# Patient Record
Sex: FEMALE | Race: ASIAN
[De-identification: names, ages, dates, MRNs, and addresses within clinical notes are randomized per-mention and may not be internally consistent; named-entity substitution may affect disease eponyms.]

---

## 2021-03-15 ENCOUNTER — HOSPITAL ENCOUNTER (OUTPATIENT)
Dept: HOSPITAL 76 - DI | Age: 51
Discharge: HOME | End: 2021-03-15
Attending: STUDENT IN AN ORGANIZED HEALTH CARE EDUCATION/TRAINING PROGRAM
Payer: COMMERCIAL

## 2021-03-15 DIAGNOSIS — N63.21: Primary | ICD-10-CM

## 2021-03-15 DIAGNOSIS — N63.32: ICD-10-CM

## 2021-03-15 PROCEDURE — 71260 CT THORAX DX C+: CPT

## 2021-03-15 PROCEDURE — 78306 BONE IMAGING WHOLE BODY: CPT

## 2021-03-15 PROCEDURE — 74177 CT ABD & PELVIS W/CONTRAST: CPT

## 2021-03-15 NOTE — CT REPORT
PROCEDURE:  Abdomen/Pelvis W

 

INDICATIONS:  HX L BREAST CA

 

CONTRAST:  IV CONTRAST: Optiray 320 ml: 100 PO CONTRAST: Isovue 300 ml50

 

TECHNIQUE:  

After the administration of oral and intravenous contrast, 5 mm thick sections acquired from the diap
hragms to the symphysis.  5 mm thick coronal and sagittal reformats were acquired.  For radiation dos
e reduction, the following was used:  automated exposure control, adjustment of mA and/or kV accordin
g to patient size.  

 

COMPARISON:  None.

 

FINDINGS:  

Image quality:  Excellent.  

 

ABDOMEN:  

Lung bases:  Lung bases are clear.  Heart size is normal.  

 

Solid organs:  Liver and spleen are normal in size and enhancement.  Gallbladder appears normal.  Rafa
iary system is non dilated.  Pancreas enhances normally.  No adrenal nodules.  Kidneys demonstrate no
rmal size and enhancement, without hydronephrosis.  

 

Peritoneum and bowel:  Bowel loops demonstrate normal wall thickness and caliber.  No free fluid or a
ir.  

 

Nodes and vessels:  No retroperitoneal or mesenteric adenopathy by size criteria.  Aorta and inferior
 vena cava are normal in size.  

 

Miscellaneous:  No ventral hernias.  

 

 

PELVIS:  

Genitourinary:  Bladder wall thickness is normal.  The uterus has an abnormal contour with heterogene
ous enhancement, most likely secondary to multiple uterine fibroids. No suspicious adnexal mass is id
entified.

 

Miscellaneous:  No inguinal hernias or adenopathy.  

 

Bones:  No suspicious bony lesions.  No vertebral body compression fractures.  

 

IMPRESSION:  

1.  No CT evidence of metastatic disease in the abdomen or pelvis.

 

2.  Heterogeneous enhancement and lobular appearance of the uterus is most likely secondary to multip
le uterine fibroids. Pelvic ultrasound may be obtained for confirmation if clinically indicated.

 

Reviewed by: Marlon Long MD on 3/15/2021 3:21 PM PDT

Approved by: Marlon Long MD on 3/15/2021 3:21 PM PDT

 

 

Station ID:  [id number]

## 2021-03-15 NOTE — CT REPORT
PROCEDURE:  CHEST W

 

INDICATIONS:  HX L BREAST CA

 

CONTRAST:  IV CONTRAST: Optiray 320 ml: 100 PO CONTRAST: Isovue 300 ml50

 

TECHNIQUE:  

After the administration of intravenous contrast, 5 mm thick sections acquired from the pulmonary api
juancho to the posterior costophrenic angles.  7 mm thick coronal MIP reformats were acquired.  For radia
tion dose reduction, the following was used:  automated exposure control, adjustment of mA and/or kV 
according to patient size.

 

COMPARISON:  None.

 

FINDINGS:  

Image quality:  Excellent.  

 

Lungs and pleura:  No acute air space opacities.  No pleural effusions or pneumothorax.  Central and 
peripheral airways are patent and normal in caliber.  

 

Mediastinum:  Heart size is normal.  No pericardial effusion.  No mediastinal or hilar adenopathy by 
size criteria.  Thoracic aorta and central pulmonary arteries are normal in size.  Esophagus is emily
l in caliber.  No hiatal hernia.  

 

Bones and chest wall:  A peripherally enhancing mass is seen in the upper outer quadrant of the left 
breast measuring approximately 5.9 x 4.1 x 6.5 cm. There is central hypoattenuation that may indicate
 necrosis. A few surgical clips are seen along the posterolateral margin of the mass. The mass abuts 
the skin surface anterosuperiorly. There is focal obliteration of the fat plane between the mass in t
he left anterior chest wall (image 32 of series 3). 

 

A 2 x 1.6 cm peripherally enhancing lesion is seen deep to the lateral margin of the pectoralis major
 muscle that may represent a enlarged level 2 lymph node. A heterogeneously enhancing mass in the lef
t axilla measures 3.2 x 1.9 cm with associated surgical clips, most likely representing axillary lymp
hadenopathy.

 

Possible nonspecific small left internal mammary lymph nodes are seen (images 33 and 26 of series 3) 
measuring up to 0.4 cm in short axis diameter. 

 

No supraclavicular lymph nodes are seen. No right axillary lymph nodes are identified. 

 

No suspicious bony lesions.  No vertebral body compression fractures.  Thyroid gland appears normal. 
 

 

Abdomen:  Visualized upper abdominal solid organs appear normal.  Upper abdominal bowel loops are nor
mal in caliber.  

 

IMPRESSION:  

1.  Large peripherally enhancing mass in the left breast measures up to 6.5 cm in greatest dimension 
and abuts the skin surface anteriorly. There is focal loss of fat plane between the mass and the ante
rior chest wall, and involvement of the adjacent musculature is not excluded. No osseous invasion is 
seen.

 

2.  Left axillary 3.2 x 1.9 cm peripherally enhancing mass most likely represents axillary metastatic
 disease. Additional enlarged level 2 lymph node is seen deep to the left pectoralis major muscle anisa
suring 2 x 1.6 cm. Questionable nonspecific small left internal mammary lymph nodes are seen measurin
g up to 0.4 cm in short axis diameter. No supraclavicular or right axillary lymphadenopathy.

 

3.  No signs of distant metastatic disease in the chest.

 

Reviewed by: Marlon Long MD on 3/15/2021 3:13 PM PDT

Approved by: Marlon Long MD on 3/15/2021 3:13 PM PDT

 

 

Station ID:  [id number]

## 2021-03-15 NOTE — NUCLEAR MEDICINE REPORT
PROCEDURE:  Bone Whole Body

 

INDICATIONS:  HX L BREAST CA

 

RADIOPHARMACEUTICAL:  26.1 mCi Tc-99m MDP IV.  

 

TECHNIQUE:  

Delayed whole-body scintigrams were obtained approximately 3-4 hours after intravenous injection of r
adiotracer.  Anterior and posterior views were acquired from vertex to feet.  Additional left and rig
ht oblique views of the thorax as well as lateral views of the calvarium were obtained.  

 

COMPARISON:  CT chest, abdomen, pelvis 3/15/2021

 

FINDINGS:  

 

No suspicious foci of uptake demonstrated to suggest osseous metastatic disease. There is normal excr
etion within the kidneys and bladder. There is a small amount of radiopharmaceutical projecting over 
the left chest wall which may correspond to the patient's left breast cancer.

 

IMPRESSION:  

 

1. No definite osseous metastatic disease.

 

2. Indistinct activity demonstrated in the region of the left chest wall may correspond to uptake in 
patient's left breast cancer.

 

Reviewed by: Luis Fernando Gudino MD on 3/15/2021 2:34 PM PDT

Approved by: Luis Fernando Gudino MD on 3/15/2021 2:34 PM PDT

 

 

Station ID:  [id number]

## 2022-06-14 ENCOUNTER — HOSPITAL ENCOUNTER (OUTPATIENT)
Dept: HOSPITAL 76 - DI | Age: 52
Discharge: HOME | End: 2022-06-14
Attending: INTERNAL MEDICINE
Payer: COMMERCIAL

## 2022-06-14 DIAGNOSIS — C50.412: Primary | ICD-10-CM

## 2022-06-14 DIAGNOSIS — Z90.12: ICD-10-CM

## 2022-06-14 PROCEDURE — 74176 CT ABD & PELVIS W/O CONTRAST: CPT

## 2022-06-14 PROCEDURE — 71250 CT THORAX DX C-: CPT

## 2022-06-14 NOTE — CT REPORT
PROCEDURE:  CHEST WO

 

INDICATIONS:  F/U BREAST CANCER STAGE 3-4

 

TECHNIQUE: 

Noncontrast 1mm axial images were acquired from the pulmonary apices to the posterior costophrenic an
gles.  Axial 5 mm soft tissue kernel reconstructions were performed as well as 8 mm axial MIP and cor
onal and sagittal 5 mm reformations. For radiation dose reduction, the following was used: automate
d exposure control, adjustment of mA and/or kV according to patient size.

 

COMPARISON:  3/15/2021

 

FINDINGS:  

Image quality:  Excellent.  

 

Lungs and pleura:  No acute air space opacities.  No pleural effusions or pneumothorax.  Central and 
peripheral airways are patent and normal in caliber.  

 

Mediastinum:  Heart size is normal.  No pericardial effusion.  No mediastinal adenopathy by size crit
eria.  Thoracic aorta and central pulmonary arteries are normal in size.  Esophagus is normal in ruben
senthil.  No hiatal hernia.  

 

Bones and chest wall:  No suspicious bony lesions.  No vertebral body compression fractures.  No axil
maria ines or supraclavicular adenopathy by size criteria. Surgical absence of the left breast. Left axilla
ry node resection. Right chest Port-A-Cath, the tip which extends to the SVC right atrial junction. T
hyroid is grossly unremarkable as imaged.

 

Abdomen: Large fecal load. Visualized upper abdominal solid organs and bowel loops appear normal in t
he absence of contrast.  

 

IMPRESSION:  

 

1. Interval left mastectomy and left axillary node resection.

 

2. No evidence of residual, recurrent, or static disease in the chest.

 

3. Large fecal load.

 

 

CLINICAL RECOMMENDATION STATEMENTS:

In patients <35 years with an ITN detected on CT, MRI, or extrathyroidal ultrasound, the Committee re
commends further evaluation with dedicated thyroid ultrasound if the nodule is "e1 cm and has no susp
icious imaging features, and if the patient has normal life expectancy.

In patients "e35 years with an ITN detected on CT, MRI, or extrathyroidal ultrasound, the Committee r
ecommends further evaluation with dedicated thyroid ultrasound if the nodule is "e1.5 cm and has no s
uspicious imaging features, and if the patient has normal life expectancy. (ACR, 2014)

 

Reviewed by: Ken Aguirre MD on 6/14/2022 12:21 PM PDT

Approved by: Ken Aguirre MD on 6/14/2022 12:21 PM PDT

 

 

Station ID:  [id number]

## 2022-06-14 NOTE — CT REPORT
PROCEDURE:  Abdomen/Pelvis WO

 

INDICATIONS:  F/U BREAST CANCER STAGE 3-4

 

TECHNIQUE:  

Noncontrast 5 mm thick sections acquired from the diaphragms to the symphysis.  5 mm coronal and sagi
ttal reformats were then performed.  For radiation dose reduction, the following was used:  automated
 exposure control, adjustment of mA and/or kV according to patient size.

 

COMPARISON:  CT abdomen and pelvis with contrast dated 3/15/2021.

 

FINDINGS:  

Image quality:  Excellent.  

 

ABDOMEN:  

Lung bases:  Lung bases are clear.  Heart size is normal.  Left breast is surgically absent.

 

Solid organs:  Liver and spleen are normal in size.  Gallbladder is unremarkable, without calcified g
allstones.  Pancreas is normal in contours.  No adrenal nodules.  Kidneys are normal in size, without
 hydronephrosis or nephrolithiasis.  

 

Peritoneum and bowel:  Unenhanced bowel loops demonstrate normal wall thickness and caliber.  No free
 fluid or air.  Very large fecal load.

 

Nodes and vessels:  No retroperitoneal or mesenteric adenopathy by size criteria.  Aorta and inferior
 vena cava are normal in caliber.  

 

Miscellaneous:  No ventral hernias.  

 

 

PELVIS:  

Genitourinary:  Bladder wall thickness is normal.  

 

Miscellaneous:  No inguinal hernias or adenopathy.  

 

Bones:  No suspicious bony lesions.  No vertebral body compression fractures.  

 

IMPRESSION:  

 

1. Negative CT of the abdomen and pelvis with contrast for evidence of metastatic disease.

 

2. Very large fecal load.

 

Reviewed by: Ken Aguirre MD on 6/14/2022 12:36 PM PDT

Approved by: Ken Aguirre MD on 6/14/2022 12:36 PM PDT

 

 

Station ID:  [id number]

## 2023-03-14 ENCOUNTER — HOSPITAL ENCOUNTER (OUTPATIENT)
Dept: HOSPITAL 76 - DI | Age: 53
Discharge: HOME | End: 2023-03-14
Attending: INTERNAL MEDICINE
Payer: COMMERCIAL

## 2023-03-14 DIAGNOSIS — M79.81: ICD-10-CM

## 2023-03-14 DIAGNOSIS — C50.412: Primary | ICD-10-CM

## 2023-03-14 DIAGNOSIS — M25.532: ICD-10-CM

## 2023-03-14 PROCEDURE — 71260 CT THORAX DX C+: CPT

## 2023-03-14 PROCEDURE — 74177 CT ABD & PELVIS W/CONTRAST: CPT

## 2023-03-14 NOTE — CT REPORT
PROCEDURE:  CHEST W

 

INDICATIONS:  BREAST CA

 

CONTRAST:100ml Omnipaque 300 

 

TECHNIQUE:  

After the administration of intravenous contrast, 1 mm axial images were acquired from the pulmonary 
apices through the posterior costophrenic angles.  Axial 5 mm soft tissue kernel reconstructions were
 performed as well as 8 mm axial MIP and coronal and sagittal 5 mm reformations.  For radiation dose 
reduction, the following was used:  automated exposure control, adjustment of mA and/or kV according 
to patient size.

 

COMPARISON:  CT chest, 6/14/2022.

 

FINDINGS:  

Image quality:  Excellent.  

 

Lungs and pleura:  No acute air space opacities.  No pleural effusions or pneumothorax.  Central and 
peripheral airways are patent and normal in caliber.  

 

Mediastinum:  Heart size is normal.  No pericardial effusion.  No mediastinal or hilar adenopathy by 
size criteria.  Thoracic aorta and central pulmonary arteries are normal in size.  Esophagus is emily
l in caliber.  No hiatal hernia.  

 

Bones and chest wall:  Left mastectomy. Surgical clips in left axilla consistent with axillary lymph 
node dissection. No suspicious bony lesions.  No vertebral body compression fractures.  No axillary o
r supraclavicular adenopathy by size criteria.  The thyroid is normal in size and there are no incide
ntal findings. There is a Port-A-Cath in the right anterior chest.

 

Abdomen:  Visualized upper abdominal solid organs appear normal.  Upper abdominal bowel loops are nor
mal in caliber.  

 

IMPRESSION:  

 

1. Postsurgical changes related to left mastectomy and axillary lymph node dissection.

2. No nati or distant metastasis in thorax.

 

Reviewed by: Phil Link MD on 3/14/2023 3:44 PM AKDT

Approved by: Phil Link MD on 3/14/2023 3:44 PM AKDT

 

 

Station ID:  [id number]

## 2023-03-15 NOTE — CT REPORT
PROCEDURE:  ABDOMEN/PELVIS W

 

INDICATIONS:  BREAST CA

 

CONTRAST: 100ml Omnipaque 300 

 

TECHNIQUE:  

After the administration of oral and intravenous contrast, 5 mm thick sections acquired from the diap
hragms to the symphysis.  5 mm thick coronal and sagittal reformats were acquired.  For radiation dos
e reduction, the following was used:  automated exposure control, adjustment of mA and/or kV accordin
g to patient size.  

 

COMPARISON:  CT abdomen and pelvis, 6/14/2022. CT of abdomen and pelvis, 3/15/2021.

 

FINDINGS:  

Image quality:  Excellent.  

 

ABDOMEN:  

Lung bases:  Lung bases are clear.  Heart size is normal.  

 

Solid organs:  Liver and spleen are normal in size and enhancement.  Gallbladder is normal  Biliary s
ystem is non dilated.  Pancreas enhances normally.  No adrenal nodules.  Kidneys demonstrate normal s
ize and enhancement, without hydronephrosis.  

 

Peritoneum and bowel:  Bowel loops demonstrate normal wall thickness and caliber. There is a large am
ount stool in colon. No free fluid or air.  

 

Nodes and vessels:  No retroperitoneal or mesenteric adenopathy by size criteria.  Aorta and inferior
 vena cava are normal in size.  

 

Miscellaneous:  No ventral hernias.  

 

 

PELVIS:  

Genitourinary:  Bladder wall thickness is normal.  

 

Miscellaneous:  No inguinal hernias or adenopathy.  

 

Bones:  No suspicious bony lesions.  No vertebral body compression fractures.  

 

IMPRESSION:  

 

1. No metastatic disease is identified in abdomen or pelvis.

2. A large amount of stool in colon.

 

Reviewed by: Phil Link MD on 3/15/2023 9:17 AM NIVIA

Approved by: Phil Link MD on 3/15/2023 9:17 AM AKVINNY

 

 

Station ID:  [id number]

## 2023-08-03 ENCOUNTER — HOSPITAL ENCOUNTER (OUTPATIENT)
Dept: HOSPITAL 76 - DI | Age: 53
Discharge: HOME | End: 2023-08-03
Attending: INTERNAL MEDICINE
Payer: COMMERCIAL

## 2023-08-03 DIAGNOSIS — C50.412: Primary | ICD-10-CM

## 2023-08-03 PROCEDURE — 70553 MRI BRAIN STEM W/O & W/DYE: CPT

## 2023-08-03 NOTE — MRI REPORT
PROCEDURE:  BRAIN W/WO

 

INDICATIONS:  BREAST CA

 

CONTRAST: GADAVIST 4.9 ML 

                       

TECHNIQUE:  

Noncontrast axial T1 spin echo, axial T2 fast spin echo, sagittal and axial FLAIR, coronal T2 fast sp
in echo, axial gradient echo, axial diffusion and ADC through the brain.  After the administration of
 contrast, axial and coronal T1 spin echo with fat saturation through the brain.  

 

COMPARISON:  None.

 

FINDINGS:  

Image quality:  Excellent.  

 

CSF spaces:  Basal cisterns are patent.  No extra-axial fluid collections.  Ventricles are normal in 
size and shape.  

 

Brain:  No midline shift.  No intracranial bleeds or masses.  No abnormal intracranial enhancement.  
There is cerebral volume loss for age.  There is periventricular white matter chronic small vessel is
chemic change.  The brainstem appears normal.  Diffusion-weighted images demonstrate no acute ischemi
c insults.  No chronic ischemic insults.  Normal intravascular flow voids are present.  

 

Skull and face:  Calvarial marrow is normal in signal.  Orbits appear normal.  

 

Sinuses:  Sinuses and mastoids appear clear.  

 

IMPRESSION:  No evidence of intracranial metastatic disease. No acute intracranial abnormalities.

 

Reviewed by: Edwin Weeks MD on 8/3/2023 11:32 AM PDT

Approved by: Edwin Weeks MD on 8/3/2023 11:32 AM PDT

 

 

Station ID:  [id number]

## 2023-08-09 ENCOUNTER — HOSPITAL ENCOUNTER (OUTPATIENT)
Dept: HOSPITAL 76 - DI | Age: 53
Discharge: HOME | End: 2023-08-09
Attending: INTERNAL MEDICINE
Payer: COMMERCIAL

## 2023-08-09 DIAGNOSIS — Z90.12: ICD-10-CM

## 2023-08-09 DIAGNOSIS — M79.81: ICD-10-CM

## 2023-08-09 DIAGNOSIS — M25.532: ICD-10-CM

## 2023-08-09 DIAGNOSIS — C50.412: Primary | ICD-10-CM

## 2023-08-09 PROCEDURE — 74177 CT ABD & PELVIS W/CONTRAST: CPT

## 2023-08-09 PROCEDURE — 71260 CT THORAX DX C+: CPT

## 2023-08-10 NOTE — CT REPORT
PROCEDURE:  ABDOMEN/PELVIS W

 

INDICATIONS:  BREAST CA

 

CONTRAST: 100ml Omni 300 

 

TECHNIQUE: 

After the administration of intravenous and oral contrast, 5 mm thick sections acquired from the diap
hragms to the symphysis.  5 mm thick coronal and sagittal reformats were acquired.  For radiation dos
e reduction, the following was used:  automated exposure control, adjustment of mA and/or kV accordin
g to patient size.  

 

COMPARISON:  CT abdomen 3/14/2023

 

FINDINGS:  

Image quality: Excellent.

 

Lung bases and heart: Please see separately dictated CT chest acquired concurrently.

 

Liver: No solid mass.

Gallbladder and biliary tree: No radiopaque stones or wall thickening. No biliary dilation.

Spleen: No splenomegaly.

Pancreas: No pancreatic ductal dilation.

Adrenals: No adrenal nodule.

Kidneys and ureters: No hydronephrosis. No renal cystic lesion which requires follow up. No solid mas
s. 

 

Bowel and peritoneum: No bowel distension. No pathologic free fluid.

Lymph nodes: No central or retroperitoneal adenopathy.

Vessels: No infrarenal aortic aneurysm.

 

PELVIS

Reproductive organs: Unremarkable.

Bladder: No abnormal wall thickening, accounting for underdistension.

Pelvic lymph nodes: No pelvic adenopathy by size criteria.

 

Bones: No aggressive osseous abnormality.

Other: No significant ventral or inguinal hernia.

 

 

IMPRESSION:  

No evidence of metastatic disease in the abdomen or pelvis.

 

 

 

Reviewed by: Yesi Fisher MD on 8/10/2023 12:56 AM PDT

Approved by: Yesi Fisher MD on 8/10/2023 12:56 AM PDT

 

 

Station ID:  [id number]

## 2023-08-10 NOTE — CT REPORT
PROCEDURE:  CHEST W

 

INDICATIONS:  BREAST CA

 

CONTRAST: 100ml Omni 300 

 

TECHNIQUE:  

After the administration of intravenous contrast, 1 mm axial images were acquired from the pulmonary 
apices through the posterior costophrenic angles.  Axial 5 mm soft tissue kernel reconstructions were
 performed as well as 8 mm axial MIP and coronal and sagittal 5 mm reformations.  For radiation dose 
reduction, the following was used:  automated exposure control, adjustment of mA and/or kV according 
to patient size.

 

COMPARISON:  CT chest abdomen and pelvis 3/14/2023.

 

FINDINGS:  

Image quality:  Excellent.  

 

Lungs and pleura: No consolidation. No pleural effusions. No  pneumothorax. No suspicious pulmonary n
odules which require follow up.  

 

Mediastinum: Heart size is normal. No pericardial effusion. No large vessel abnormality. No mediastin
al adenopathy by size criteria.  Right chest port catheter in place with tip terminating in the right
 atrium.

 

Chest wall and lower neck: Thyroid is unremarkable. Status post left mastectomy and axillary lymph no
de dissection.

 

Bones: No aggressive osseous abnormality.

 

Upper Abdomen: Please see separately dictated CT abdomen and pelvis obtained concurrently. 

 

IMPRESSION:

Status post left mastectomy and left axillary node dissection. No evidence of metastatic disease in t
he chest.

 

 

 

Reviewed by: Yesi Fisher MD on 8/10/2023 12:51 AM PDT

Approved by: Yesi Fisher MD on 8/10/2023 12:51 AM PDT

 

 

Station ID:  [id number]

## 2024-09-16 ENCOUNTER — HOSPITAL ENCOUNTER (OUTPATIENT)
Dept: HOSPITAL 76 - DI | Age: 54
Discharge: HOME | End: 2024-09-16
Attending: INTERNAL MEDICINE
Payer: COMMERCIAL

## 2024-09-16 DIAGNOSIS — C50.412: ICD-10-CM

## 2024-09-16 DIAGNOSIS — R59.0: Primary | ICD-10-CM

## 2024-09-16 DIAGNOSIS — M79.81: ICD-10-CM

## 2024-09-16 DIAGNOSIS — R97.0: ICD-10-CM

## 2024-09-16 DIAGNOSIS — C79.51: ICD-10-CM

## 2024-09-16 PROCEDURE — 71552 MRI CHEST W/O & W/DYE: CPT

## 2024-09-16 RX ADMIN — GADOTERATE MEGLUMINE ONE MMOL: 376.9 INJECTION, SOLUTION INTRAVENOUS at 15:11

## 2024-09-17 NOTE — MRI REPORT
PROCEDURE:  Chest W/WO

 

INDICATIONS:  BREAST CA WITH STERNAL INVOLVMENT

 

CONTRAST: clariscan 9.4ml 

 

TECHNIQUE:  

Precontrast Axial 2-D spoiled GE in- and out-of-phase, axial breath-hold T2 FSE, axial STIR FSE.  Aft
er administration of contrast, axial 2-D spoiled GE with fat saturation acquired over the lesion of c
oncern.  

 

COMPARISON:  5/22/2024

 

FINDINGS:  

Image quality:  Excellent.  

 

Region of interest:  There is a soft tissue mass encasing the sternum. The mass measures approximatel
y 5.0 x 2.2 x 8.9 cm (transverse, AP, CC). The mass also makes contact with the costochondral junctio
n of the bilateral second through fourth ribs. There is an enlarged left internal mammary node measur
ing 9 mm short axis (series 19, image 30).

 

No significant pulmonary nodule. No suspicious liver lesion. No axillary adenopathy.

 

IMPRESSION: 

Soft tissue mass encasing the sternum, most consistent with osseous metastatic disease.

 

Enlarged left internal mammary lymph node, concerning for nati disease.

 

Reviewed by: Adrian Zaragoza MD on 9/17/2024 12:33 PM PDT

Approved by: Adrian Zaragoza MD on 9/17/2024 12:33 PM PDT

 

 

Station ID:  [id number]